# Patient Record
Sex: MALE | Race: WHITE | NOT HISPANIC OR LATINO | Employment: FULL TIME | URBAN - METROPOLITAN AREA
[De-identification: names, ages, dates, MRNs, and addresses within clinical notes are randomized per-mention and may not be internally consistent; named-entity substitution may affect disease eponyms.]

---

## 2017-01-13 ENCOUNTER — ALLSCRIPTS OFFICE VISIT (OUTPATIENT)
Dept: OTHER | Facility: OTHER | Age: 42
End: 2017-01-13

## 2017-01-31 ENCOUNTER — GENERIC CONVERSION - ENCOUNTER (OUTPATIENT)
Dept: OTHER | Facility: OTHER | Age: 42
End: 2017-01-31

## 2017-06-01 ENCOUNTER — GENERIC CONVERSION - ENCOUNTER (OUTPATIENT)
Dept: OTHER | Facility: OTHER | Age: 42
End: 2017-06-01

## 2017-07-19 ENCOUNTER — GENERIC CONVERSION - ENCOUNTER (OUTPATIENT)
Dept: OTHER | Facility: OTHER | Age: 42
End: 2017-07-19

## 2017-08-14 ENCOUNTER — ALLSCRIPTS OFFICE VISIT (OUTPATIENT)
Dept: OTHER | Facility: OTHER | Age: 42
End: 2017-08-14

## 2017-08-14 DIAGNOSIS — R55 SYNCOPE AND COLLAPSE: ICD-10-CM

## 2017-08-14 DIAGNOSIS — I10 ESSENTIAL (PRIMARY) HYPERTENSION: ICD-10-CM

## 2017-08-15 ENCOUNTER — TRANSCRIBE ORDERS (OUTPATIENT)
Dept: LAB | Facility: HOSPITAL | Age: 42
End: 2017-08-15

## 2017-08-15 DIAGNOSIS — I10 ESSENTIAL HYPERTENSION, MALIGNANT: Primary | ICD-10-CM

## 2017-08-25 ENCOUNTER — HOSPITAL ENCOUNTER (OUTPATIENT)
Dept: NON INVASIVE DIAGNOSTICS | Facility: HOSPITAL | Age: 42
Discharge: HOME | End: 2017-08-25
Payer: COMMERCIAL

## 2017-08-25 DIAGNOSIS — R55 SYNCOPE AND COLLAPSE: ICD-10-CM

## 2017-08-25 PROCEDURE — 93306 TTE W/DOPPLER COMPLETE: CPT

## 2017-08-28 ENCOUNTER — APPOINTMENT (OUTPATIENT)
Dept: LAB | Facility: HOSPITAL | Age: 42
End: 2017-08-28
Payer: COMMERCIAL

## 2017-08-28 DIAGNOSIS — I10 ESSENTIAL (PRIMARY) HYPERTENSION: ICD-10-CM

## 2017-08-28 LAB
ALBUMIN SERPL BCP-MCNC: 3.8 G/DL (ref 3.5–5)
ALP SERPL-CCNC: 101 U/L (ref 46–116)
ALT SERPL W P-5'-P-CCNC: 30 U/L (ref 12–78)
ANION GAP SERPL CALCULATED.3IONS-SCNC: 10 MMOL/L (ref 4–13)
AST SERPL W P-5'-P-CCNC: 20 U/L (ref 5–45)
BASOPHILS # BLD AUTO: 0.02 THOUSANDS/ΜL (ref 0–0.1)
BASOPHILS NFR BLD AUTO: 0 % (ref 0–1)
BILIRUB SERPL-MCNC: 0.41 MG/DL (ref 0.2–1)
BUN SERPL-MCNC: 11 MG/DL (ref 5–25)
CALCIUM SERPL-MCNC: 9.1 MG/DL (ref 8.3–10.1)
CHLORIDE SERPL-SCNC: 105 MMOL/L (ref 100–108)
CHOLEST SERPL-MCNC: 265 MG/DL (ref 50–200)
CO2 SERPL-SCNC: 21 MMOL/L (ref 21–32)
CREAT SERPL-MCNC: 1.08 MG/DL (ref 0.6–1.3)
EOSINOPHIL # BLD AUTO: 0 THOUSAND/ΜL (ref 0–0.61)
EOSINOPHIL NFR BLD AUTO: 0 % (ref 0–6)
ERYTHROCYTE [DISTWIDTH] IN BLOOD BY AUTOMATED COUNT: 13.2 % (ref 11.6–15.1)
GFR SERPL CREATININE-BSD FRML MDRD: 85 ML/MIN/1.73SQ M
GLUCOSE P FAST SERPL-MCNC: 121 MG/DL (ref 65–99)
HCT VFR BLD AUTO: 38.3 % (ref 36.5–49.3)
HDLC SERPL-MCNC: 40 MG/DL (ref 40–60)
HGB BLD-MCNC: 12.9 G/DL (ref 12–17)
LDLC SERPL CALC-MCNC: 172 MG/DL (ref 0–100)
LYMPHOCYTES # BLD AUTO: 1.89 THOUSANDS/ΜL (ref 0.6–4.47)
LYMPHOCYTES NFR BLD AUTO: 35 % (ref 14–44)
MCH RBC QN AUTO: 28.4 PG (ref 26.8–34.3)
MCHC RBC AUTO-ENTMCNC: 33.7 G/DL (ref 31.4–37.4)
MCV RBC AUTO: 84 FL (ref 82–98)
MONOCYTES # BLD AUTO: 0.45 THOUSAND/ΜL (ref 0.17–1.22)
MONOCYTES NFR BLD AUTO: 8 % (ref 4–12)
NEUTROPHILS # BLD AUTO: 2.96 THOUSANDS/ΜL (ref 1.85–7.62)
NEUTS SEG NFR BLD AUTO: 57 % (ref 43–75)
NRBC BLD AUTO-RTO: 0 /100 WBCS
PLATELET # BLD AUTO: 230 THOUSANDS/UL (ref 149–390)
PMV BLD AUTO: 10.4 FL (ref 8.9–12.7)
POTASSIUM SERPL-SCNC: 3.6 MMOL/L (ref 3.5–5.3)
PROT SERPL-MCNC: 8.1 G/DL (ref 6.4–8.2)
RBC # BLD AUTO: 4.54 MILLION/UL (ref 3.88–5.62)
SODIUM SERPL-SCNC: 136 MMOL/L (ref 136–145)
T3 SERPL-MCNC: 0.7 NG/ML (ref 0.6–1.8)
T4 FREE SERPL-MCNC: 0.73 NG/DL (ref 0.76–1.46)
T4 SERPL-MCNC: 4.1 UG/DL (ref 4.7–13.3)
TRIGL SERPL-MCNC: 267 MG/DL
TSH SERPL DL<=0.05 MIU/L-ACNC: 1.38 UIU/ML (ref 0.36–3.74)
VENIPUNCTURE: NORMAL
WBC # BLD AUTO: 5.35 THOUSAND/UL (ref 4.31–10.16)

## 2017-08-28 PROCEDURE — 36415 COLL VENOUS BLD VENIPUNCTURE: CPT

## 2017-08-28 PROCEDURE — 84479 ASSAY OF THYROID (T3 OR T4): CPT

## 2017-08-28 PROCEDURE — 84480 ASSAY TRIIODOTHYRONINE (T3): CPT

## 2017-08-28 PROCEDURE — 84439 ASSAY OF FREE THYROXINE: CPT

## 2017-08-28 PROCEDURE — 84443 ASSAY THYROID STIM HORMONE: CPT

## 2017-08-28 PROCEDURE — 84436 ASSAY OF TOTAL THYROXINE: CPT

## 2017-08-28 PROCEDURE — 85025 COMPLETE CBC W/AUTO DIFF WBC: CPT

## 2017-08-28 PROCEDURE — 80053 COMPREHEN METABOLIC PANEL: CPT

## 2017-08-28 PROCEDURE — 80061 LIPID PANEL: CPT

## 2017-08-29 LAB — T3RU NFR SERPL: 30 % (ref 24–39)

## 2017-08-31 ENCOUNTER — GENERIC CONVERSION - ENCOUNTER (OUTPATIENT)
Dept: OTHER | Facility: OTHER | Age: 42
End: 2017-08-31

## 2017-09-01 ENCOUNTER — ALLSCRIPTS OFFICE VISIT (OUTPATIENT)
Dept: OTHER | Facility: OTHER | Age: 42
End: 2017-09-01

## 2017-09-22 ENCOUNTER — ALLSCRIPTS OFFICE VISIT (OUTPATIENT)
Dept: OTHER | Facility: OTHER | Age: 42
End: 2017-09-22

## 2017-11-07 ENCOUNTER — ALLSCRIPTS OFFICE VISIT (OUTPATIENT)
Dept: OTHER | Facility: OTHER | Age: 42
End: 2017-11-07

## 2017-11-08 NOTE — PROGRESS NOTES
Assessment  1  Diastolic dysfunction (274 1) (I51 9)   2  Current smoker (305 1) (F17 200)   3  Benign essential hypertension (401 1) (I10)   4  Syncope and collapse (780 2) (R55)   5  Prolonged QT interval (794 31) (R94 31)   6  Esophageal varices (456 1) (I85 00)    Plan  COPD (chronic obstructive pulmonary disease)    · Ventolin  (90 Base) MCG/ACT Inhalation Aerosol Solution; IINHALE 1 OR 2  PUFFS BY MOUTH EVERY 4 - 6 HOURS AS NEEDED    Discussion/Summary    Keep follow up with cardiology in 6 weeks, follow up here in 2 months  The patient was counseled regarding diagnostic results,-- instructions for management,-- risk factor reductions,-- prognosis,-- patient and family education,-- impressions,-- risks and benefits of treatment options,-- importance of compliance with treatment  Chief Complaint  follow up since seeing cardiology      History of Present Illness  follow up for syncopal episodes with diastolic dysfunction found on echo, pt saw the cardiologist, who gave him the advice to tighten his leg muscles when standing, drink more water, and change positions slower, pt still feels lightheaded but has had no episodes of syncope since last visit, pt has not quit smoking, pt was stopped on cymbalta and zoloft, follow up for HTN, pt saw the nephrologist there was no change to his medications      Review of Systems    Cardiovascular: no chest pain-- and-- no palpitations  Respiratory: shortness of breath, but-- no wheezing  Active Problems  1  Abdominal pain (789 00) (R10 9)   2  Acute maxillary sinusitis (461 0) (J01 00)   3  Anxiety (300 00) (F41 9)   4  Asthma (493 90) (J45 909)   5  Atypical chest pain (786 59) (R07 89)   6  Benign essential hypertension (401 1) (I10)   7  Bipolar disorder (296 80) (F31 9)   8  COPD (chronic obstructive pulmonary disease) (496) (J44 9)   9  Current smoker (305 1) (F17 200)   10  Depression (311) (F32 9)   11  Diastolic dysfunction (013 3) (I51 9)   12  GERD (gastroesophageal reflux disease) (530 81) (K21 9)   13  Headache (784 0) (R51)   14  Heart burn (787 1) (R12)   15  Hematemesis (578 0) (K92 0)   16  Hypercholesterolemia (272 0) (E78 00)   17  Hypotension (458 9) (I95 9)   18  Insomnia (780 52) (G47 00)   19  Morbid or severe obesity due to excess calories (278 01) (E66 01)   20  Prolonged QT interval (794 31) (R94 31)   21  Sleep apnea (780 57) (G47 30)   22  Syncope and collapse (780 2) (R55)   23  Venous thromboembolism (453 9) (I82 90)    Past Medical History  1  History of urinary stone (V13 01) (I86 469)    Surgical History  1  History of Leg Repair   2  History of Tonsillectomy    Family History  Father    1  Family history of Diabetes (250 00) (E11 9)   2  Family history of Hypertension (401 9) (I10)  Brother    3  Family history of Diabetes (250 00) (E11 9)  Family History    4  Family history of Living In [address] [place] [address] History   · Current smoker (305 1) (F17 200)   · Denied: History of Drug Use   ·  (V61 03) (Z63 5)   · Stopped Drinking Alcohol  The social history was reviewed and is unchanged  Current Meds   1  Benztropine Mesylate 1 MG Oral Tablet; TAKE 1 TABLET 3 times daily; Therapy: (Recorded:57Otw0097) to Recorded   2  CarBAMazepine 200 MG Oral Tablet; TAKE 1 TABLET Twice daily PRN; Therapy: (Recorded:36Goi2668) to Recorded   3  ChlorproMAZINE HCl - 100 MG Oral Tablet; TAKE 1 TABLET 3 times daily; Therapy: (Recorded:41Mjx6168) to Recorded   4  CloNIDine HCl - 0 1 MG Oral Tablet; TAKE 1 TABLET TWICE DAILY  Requested for:   08Sep2017; Last Rx:08Sep2017 Ordered   5  DULoxetine HCl - 20 MG Oral Capsule Delayed Release Particles; take 1 capsule daily; Therapy: (Recorded:81Fja7991) to Recorded   6  Famotidine 20 MG Oral Tablet; TAKE 1 TABLET EVERY 12 HOURS DAILY; Therapy: (Recorded:25Sam2370) to Recorded   7  Lipitor 40 MG Oral Tablet; TAKE 1 TABLET DAILY; Therapy: (Recorded:69Jlv3786) to Recorded   8   Lisinopril 20 MG Oral Tablet; Take 1 tablet daily; Therapy: (Recorded:52Egd7574) to Recorded   9  Metoprolol Tartrate 50 MG Oral Tablet; TAKE 0 5 TABLET Twice daily; Therapy: 21YUB1103 to (Evaluate:06Jan2018)  Requested for: 08Sep2017; Last   Rx:08Sep2017 Ordered   10  Nicotine 21 MG/24HR Transdermal Patch 24 Hour; APPLY 1 PATCH DAILY AS DIRECTED     Requested for: 82Hyn7124; Last Rx:20Fpa0139 Ordered   11  SEROquel 50 MG Oral Tablet; TAKE 1 TABLET 3 TIMES DAILY; Therapy: (Isiah Velazquez) to Recorded   12  TraZODone HCl - 50 MG Oral Tablet; TAKE 1 TABLET AT BEDTIME AS NEEDED; Therapy: 01Sep2017 to (Evaluate:31Oct2017)  Requested for: 01Sep2017; Last    Rx:01Sep2017 Ordered    The medication list was reviewed and updated today  Allergies  1  No Known Drug Allergies  2  Tape    Vitals  Vital Signs    Recorded: 02EJB7162 02:32PM   Temperature 98 1 F, Tympanic   Heart Rate 98   Systolic 151   Diastolic 90   Height 5 ft 9 in   Weight 268 lb    BMI Calculated 39 58   BSA Calculated 2 34   O2 Saturation 98     Physical Exam    Constitutional   General appearance: No acute distress, well appearing and well nourished  well developed,-- appears healthy,-- well nourished-- and-- well hydrated  Eyes   Conjunctiva and lids: No swelling, erythema, or discharge  Pulmonary   Respiratory effort: No increased work of breathing or signs of respiratory distress  Respiratory rate: normal  Assessment of respiratory effort revealed normal rhythm and effort  Auscultation of lungs: Clear to auscultation, equal breath sounds bilaterally, no wheezes, no rales, no rhonci  no rales or crackles were heard bilaterally  no rhonchi  no friction rub  no wheezing  Cardiovascular   Auscultation of heart: Normal rate and rhythm, normal S1 and S2, without murmurs  The heart rate was normal  The rhythm was regular  Heart sounds: normal S1  no murmurs were heard  Lymphatic   Palpation of lymph nodes in neck: No lymphadenopathy  Skin   Skin and subcutaneous tissue: Normal without rashes or lesions      Psychiatric   Mood and affect: Normal          Signatures   Electronically signed by : Marilyn Ordonez DO; Nov 7 2017  3:03PM EST                       (Author)

## 2018-01-09 NOTE — MISCELLANEOUS
Message  RECEIVED A CALL FROM MARY NAN RODRIGUEZ [facility] VASCULAR LAB RE: Danette Lockwood  PATIENT WAS THERE THIS MORNING TO HAVE A DOPPLER DONE, IT IS POSITIVE FOR SUPERFICIAL PHLEBITIS THROMBOSIS THAT EXTENDS FROM HIS MID THIGH TO JUST BELOW HIS KNEE THEN BREAKS UP  SHE STESSED THAT IT IS SUPERFICIAL NOT DEEP  ALSO STATES THAT THE PATIENT COMPLAINED THAT WHEN HE WAS IN THE ED THE OTHER DAY THAT HE RECEIVED A SHOT OF LOVENOX AND THEN WENT INTO THE [place] AND THREW UP BLOOD  I SPOKE WHO WAS WAITING IN THE VASCULAR LAB AT THE TIME AND HE DID STATE THAT HE RECEIVED THE SHOT AND A PERCOCET IN THE ER AND THAT HE IMMEDIATE HAD STOMACH UPSET AND DID INFORM THE ER DR BUT SAID THAT HE WAS OK  PATIENT DID STATE THAT HE HAS A HISTORY OF STOMACH ULCERS  I SCHEDULED HIM FOR A FOLLOW UP WITH DR Gregory Young FOR [facility] 9/12/16 AT 10:15am    I CALLED AND SPOKE WITH DR Gregory Young WHO IS OUT OF THE OFFICE AND INFORMED HIM OF THE ISSUE  HE WOULD LIKE TO REFER PATIENT TO GI IMMEDIATELY TO HAVE HIS STOMACH TAKEN CARE OF AND HE WILL SEE HIM ON MONDAY FOR FOLLOW UP CARE  MM  I CALLED AND LEFT A MESSAGE FOR THE PATIENT TO CALL BACK RE: GI DR DONOVAN      Active Problems    1  Abdominal pain (789 00) (R10 9)   2  Anxiety (300 00) (F41 9)   3  Benign essential hypertension (401 1) (I10)   4  Bipolar disorder (296 80) (F31 9)   5  Depression (311) (F32 9)   6  Headache (784 0) (R51)   7  Heart burn (787 1) (R12)   8  Hypercholesterolemia (272 0) (E78 0)   9  Hypotension (458 9) (I95 9)   10  Morbid or severe obesity due to excess calories (278 01) (E66 01)   11  Venous thromboembolism (453 9) (I82 90)    Current Meds   1  ALPRAZolam 0 5 MG Oral Tablet (Xanax); TAKE 1 TABLET 3 TIMES DAILY AS NEEDED;   Last Rx:37Fcu2879 Ordered   2  CloNIDine HCl - 0 3 MG Oral Tablet; take 1 tablet by mouth twice a day; Therapy: 94TRL6046 to (Evaluate:12Apr2017)  Requested for: 46Rnz6547; Last   Rx:81Dxe0663 Ordered   3   Metoprolol Tartrate 25 MG Oral Tablet; take one tablet by mouth twice daily; Therapy: 72FIE5120 to (Evaluate:27Jan2017)  Requested for: 19OYL2446; Last   Rx:84Jjn5331 Ordered   4  TraZODone HCl - 150 MG Oral Tablet; TAKE 1 TABLET AT BEDTIME  Requested for:   20Jun2016; Last Rx:20Jun2016 Ordered   5  Zoloft 100 MG Oral Tablet (Sertraline HCl); Therapy: (Recorded:20Jun2016) to Recorded    Allergies    1   No Known Drug Allergies    Signatures   Electronically signed by : Dorothy Valente DO; Sep 13 2016  2:42PM EST

## 2018-01-10 NOTE — MISCELLANEOUS
Provider Comments  Provider Comments:   Patient was a no show for his appointment today        Signatures   Electronically signed by : Naseem Amin DO; Jun 1 2017  2:36PM EST

## 2018-01-11 NOTE — MISCELLANEOUS
Provider Comments  Provider Comments:   Patient was a no show for his appointment today        Signatures   Electronically signed by : Gerard Martinez DO; Dec  5 2016  4:45PM EST

## 2018-01-13 VITALS
BODY MASS INDEX: 39.69 KG/M2 | SYSTOLIC BLOOD PRESSURE: 150 MMHG | HEART RATE: 98 BPM | DIASTOLIC BLOOD PRESSURE: 90 MMHG | WEIGHT: 268 LBS | TEMPERATURE: 98.1 F | HEIGHT: 69 IN | OXYGEN SATURATION: 98 %

## 2018-01-13 VITALS
DIASTOLIC BLOOD PRESSURE: 96 MMHG | HEART RATE: 121 BPM | WEIGHT: 250.6 LBS | HEIGHT: 70 IN | RESPIRATION RATE: 18 BRPM | TEMPERATURE: 98.4 F | SYSTOLIC BLOOD PRESSURE: 135 MMHG | BODY MASS INDEX: 35.88 KG/M2

## 2018-01-13 VITALS
HEIGHT: 70 IN | SYSTOLIC BLOOD PRESSURE: 130 MMHG | BODY MASS INDEX: 36.79 KG/M2 | HEART RATE: 111 BPM | DIASTOLIC BLOOD PRESSURE: 88 MMHG | WEIGHT: 257 LBS | TEMPERATURE: 98.2 F | OXYGEN SATURATION: 96 %

## 2018-01-13 VITALS
HEIGHT: 70 IN | WEIGHT: 258.5 LBS | DIASTOLIC BLOOD PRESSURE: 88 MMHG | TEMPERATURE: 98.4 F | BODY MASS INDEX: 37.01 KG/M2 | SYSTOLIC BLOOD PRESSURE: 110 MMHG

## 2018-01-13 VITALS
DIASTOLIC BLOOD PRESSURE: 90 MMHG | SYSTOLIC BLOOD PRESSURE: 132 MMHG | HEIGHT: 69 IN | HEART RATE: 99 BPM | BODY MASS INDEX: 38.21 KG/M2 | WEIGHT: 258 LBS

## 2018-01-13 NOTE — MISCELLANEOUS
Assessment    1  Benign essential hypertension (401 1) (I10)   2  Hypotension (458 9) (I95 9)    Plan  Benign essential hypertension    · Metoprolol Tartrate 25 MG Oral Tablet; take one tablet by mouth twice daily   Rx By: Rolanda Ruffin; Dispense: 30 Days ; #:60 Tablet; Refill: 6; For: Benign essential hypertension; ISAAC = N; Sent To: [facility]    Discussion/Summary  Discussion Summary:   Iatrogenic hypotension secondary to med changes made by the hospital, rest today tomorrow take on, reduce metoprolol to 25 mg bid down from the 100mg given by the hospital       Chief Complaint  Chief Complaint Free Text Note Form: PATIENT PRESENTS FOR A MERON FROM [facility]   Chief Complaint Chronic Condition [facility]: Patient is here today for follow up of chronic conditions described in HPI  History of Present Illness  TCM Communication [facility]: The patient is being contacted for follow-up after hospitalization and [facility]  He was hospitalized at [facility]  The dates of hospitalization: 6/25/16 - 6/28/16  Diagnosis: HTN, ELEVATED HEART RATE  He was discharged to home  Medications were not reviewed today  He scheduled a follow up appointment  Symptoms: weakness, dizziness, fatigue, chest pain and nausea, but no fever, no headache, no cough, no shortness of breath, no back pain on left side, no back pain on right side, no arm pain left side, no arm pain on right side, no leg pain on left side, no leg pain on right side, no upper abdominal pain, no middle abdominal pain, no lower abdominal pain, no rash:, no anorexia, no vomiting, no loose stools, no constipation, no pain with urinating, no incisional pain, no wound drainage and no swelling     Communication performed and completed by   HPI: hospital follow up for malignant HTN, pt was in the hospital from [address] to tuesday of this week, pt was found to have a BP of 240/140, pt had his meds changed, pt complains today of sweats, weakness, fatigue, near fainting all since his discharge, pt does not know which medications he has been put on      Review of Systems  Complete-Male:   Constitutional: feeling tired, but no fever and no chills  Eyes: eyesight problems  Cardiovascular: chest pain, but no palpitations  Gastrointestinal: nausea, but no vomiting  Neurological: headache  Active Problems    1  Abdominal pain (789 00) (R10 9)   2  Anxiety (300 00) (F41 9)   3  Benign essential hypertension (401 1) (I10)   4  Bipolar disorder (296 80) (F31 9)   5  Depression (311) (F32 9)   6  Headache (784 0) (R51)   7  Heart burn (787 1) (R12)   8  Hypercholesterolemia (272 0) (E78 0)   9  Morbid or severe obesity due to excess calories (278 01) (E66 01)    Past Medical History    1  History of urinary stone (V13 01) (G98 410)    Surgical History    1  History of Leg Repair   2  History of Tonsillectomy    Family History  Family History    1  Family history of Living In [address] [place] [address] History    · Current Every Day Smoker (305 1)   · Denied: History of Drug Use   · Stopped Drinking Alcohol    Current Meds   1  Atenolol 50 MG Oral Tablet; TAKE 1 TABLET DAILY; Therapy: (Recorded:20Jun2016) to Recorded   2  CarBAMazepine 200 MG Oral Tablet; take 1 tablet by mouth four times a day; Therapy: 57NNO0953 to (Evaluate:22Oct2016)  Requested for: 95SKM8192; Last   Rx:24Jun2016 Ordered   3  CloNIDine HCl - 0 3 MG Oral Tablet; Take 1 tablet(s) by mouth twice daily; Therapy: 84ACY8949 to (Evaluate:17Mar2017)  Requested for: 20Jun2016; Last   Rx:20Jun2016 Ordered   4  TEGretol TABS; 400 mg in the a:m; 600 mg hs;   Therapy: (Recorded:12Mar2014) to Recorded   5  TraZODone HCl - 150 MG Oral Tablet; TAKE 1 TABLET AT BEDTIME  Requested for:   20Jun2016; Last Rx:20Jun2016 Ordered   6  Xanax 0 5 MG Oral Tablet; TAKE 1 TABLET 3 TIMES DAILY AS NEEDED; Therapy: (Lonnie Blanco) to Recorded   7  Zoloft 100 MG Oral Tablet;    Therapy: (Recorded:20Jun2016) to Recorded  Medication List Reviewed: The medication list was reviewed and updated today  Allergies    1  No Known Drug Allergies    Vitals  Signs [Data Includes: Current Encounter]   Recorded: 15GPW4734 67:70AV   Systolic: 866, LUE, Sitting  Diastolic: 90, LUE, Sitting  Recorded: 87GWT7620 11:27AM   Temperature: 95 9 F  Heart Rate: 70  Systolic: 98  Diastolic: 82  Height: 5 ft 10 in  Weight: 227 lb 2 oz  BMI Calculated: 32 59  BSA Calculated: 2 2    Physical Exam    Constitutional   General appearance: Abnormal   well developed, well nourished, obese and appears tired  Eyes   Conjunctiva and lids: No swelling, erythema, or discharge  Pupils and irises: Equal, round and reactive to light  Pulmonary   Respiratory effort: No increased work of breathing or signs of respiratory distress  Auscultation of lungs: Clear to auscultation, equal breath sounds bilaterally, no wheezes, no rales, no rhonci  Cardiovascular   Auscultation of heart: Normal rate and rhythm, normal S1 and S2, without murmurs  Examination of extremities for edema and/or varicosities: Normal     Abdomen   Abdomen: Non-tender, no masses  Liver and spleen: No hepatomegaly or splenomegaly  Lymphatic   Palpation of lymph nodes in neck: No lymphadenopathy  Skin   Skin and subcutaneous tissue: Normal without rashes or lesions      Psychiatric   Orientation to person, place and time: Normal     Mood and affect: Normal          Future Appointments    Date/Time Provider Specialty Site   07/05/2016 04:30 PM Verlee Snellen, [address] [place] [address]   Electronically signed by : Veronica Ly DO; Jul 1 2016 11:48AM EST                       (Author)

## 2018-01-15 NOTE — MISCELLANEOUS
Provider Comments  Provider Comments:   Patient was a no show for today's appointment        Signatures   Electronically signed by : Shy Hopson DO; Sep 27 2016  3:44PM EST

## 2018-01-15 NOTE — MISCELLANEOUS
Provider Comments  Provider Comments:   Patient was a no show for his appointment today        Signatures   Electronically signed by : Yuval De La Rosa DO; Feb 7 2017  9:38AM EST

## 2018-01-17 NOTE — MISCELLANEOUS
Provider Comments  Provider Comments:   Dear Stephany Rehman,    You missed your appointment with Dr Manning on 09/14/2016  Please contact our office to reschedule at [phone]      Thank You,  [facility]      Signatures   Electronically signed by : Clementina Aase, ; Sep 14 2016  9:30AM EST                       (Author)

## 2018-01-19 ENCOUNTER — GENERIC CONVERSION - ENCOUNTER (OUTPATIENT)
Dept: OTHER | Facility: OTHER | Age: 43
End: 2018-01-19

## 2018-01-24 VITALS
TEMPERATURE: 98 F | SYSTOLIC BLOOD PRESSURE: 160 MMHG | DIASTOLIC BLOOD PRESSURE: 82 MMHG | HEIGHT: 69 IN | BODY MASS INDEX: 39.84 KG/M2 | WEIGHT: 269 LBS | OXYGEN SATURATION: 97 % | HEART RATE: 85 BPM

## 2018-02-05 ENCOUNTER — TELEPHONE (OUTPATIENT)
Dept: GASTROENTEROLOGY | Facility: CLINIC | Age: 43
End: 2018-02-05

## 2018-02-21 PROBLEM — Z87.19 PERSONAL HISTORY OF DIGESTIVE DISEASE: Status: ACTIVE | Noted: 2018-02-21

## 2018-02-23 ENCOUNTER — TELEPHONE (OUTPATIENT)
Dept: GASTROENTEROLOGY | Facility: CLINIC | Age: 43
End: 2018-02-23

## 2018-02-23 NOTE — TELEPHONE ENCOUNTER
DR Jeanne Ervin PT    Pt called to r/s his procedure at the [facility] location  Please call his sister to set up appt  ([facility]) [phone]

## 2018-02-27 NOTE — TELEPHONE ENCOUNTER
I spoke with pt he requested to be rescheduled with Estephanie Faustin in [place]  Procedure scheduled on 6/22/18

## 2018-06-21 ENCOUNTER — ANESTHESIA EVENT (OUTPATIENT)
Dept: PERIOP | Facility: HOSPITAL | Age: 43
End: 2018-06-21
Payer: COMMERCIAL

## 2018-06-22 ENCOUNTER — ANESTHESIA (OUTPATIENT)
Dept: PERIOP | Facility: HOSPITAL | Age: 43
End: 2018-06-22
Payer: COMMERCIAL

## 2018-06-22 ENCOUNTER — HOSPITAL ENCOUNTER (OUTPATIENT)
Facility: HOSPITAL | Age: 43
Setting detail: OUTPATIENT SURGERY
Discharge: HOME | End: 2018-06-22
Attending: INTERNAL MEDICINE | Admitting: INTERNAL MEDICINE
Payer: COMMERCIAL

## 2018-06-22 VITALS
OXYGEN SATURATION: 95 % | TEMPERATURE: 97 F | RESPIRATION RATE: 18 BRPM | HEART RATE: 108 BPM | SYSTOLIC BLOOD PRESSURE: 133 MMHG | DIASTOLIC BLOOD PRESSURE: 62 MMHG

## 2018-06-22 DIAGNOSIS — Z87.19 PERSONAL HISTORY OF DIGESTIVE DISEASE: ICD-10-CM

## 2018-06-22 DIAGNOSIS — K20.90 ESOPHAGITIS: Primary | ICD-10-CM

## 2018-06-22 PROCEDURE — 88342 IMHCHEM/IMCYTCHM 1ST ANTB: CPT | Performed by: PATHOLOGY

## 2018-06-22 PROCEDURE — 43239 EGD BIOPSY SINGLE/MULTIPLE: CPT | Performed by: INTERNAL MEDICINE

## 2018-06-22 PROCEDURE — 88305 TISSUE EXAM BY PATHOLOGIST: CPT | Performed by: PATHOLOGY

## 2018-06-22 RX ORDER — MEPERIDINE HYDROCHLORIDE 25 MG/ML
12.5 INJECTION INTRAMUSCULAR; INTRAVENOUS; SUBCUTANEOUS AS NEEDED
Status: DISCONTINUED | OUTPATIENT
Start: 2018-06-22 | End: 2018-06-22 | Stop reason: HOSPADM

## 2018-06-22 RX ORDER — SODIUM CHLORIDE, SODIUM LACTATE, POTASSIUM CHLORIDE, CALCIUM CHLORIDE 600; 310; 30; 20 MG/100ML; MG/100ML; MG/100ML; MG/100ML
125 INJECTION, SOLUTION INTRAVENOUS CONTINUOUS
Status: DISCONTINUED | OUTPATIENT
Start: 2018-06-22 | End: 2018-06-22 | Stop reason: HOSPADM

## 2018-06-22 RX ORDER — OMEPRAZOLE 40 MG/1
40 CAPSULE, DELAYED RELEASE ORAL
Qty: 60 CAPSULE | Refills: 3 | Status: SHIPPED | OUTPATIENT
Start: 2018-06-22

## 2018-06-22 RX ORDER — SODIUM CHLORIDE, SODIUM LACTATE, POTASSIUM CHLORIDE, CALCIUM CHLORIDE 600; 310; 30; 20 MG/100ML; MG/100ML; MG/100ML; MG/100ML
125 INJECTION, SOLUTION INTRAVENOUS CONTINUOUS
Status: DISCONTINUED | OUTPATIENT
Start: 2018-06-22 | End: 2018-06-22

## 2018-06-22 RX ORDER — ONDANSETRON 2 MG/ML
4 INJECTION INTRAMUSCULAR; INTRAVENOUS ONCE AS NEEDED
Status: DISCONTINUED | OUTPATIENT
Start: 2018-06-22 | End: 2018-06-22 | Stop reason: HOSPADM

## 2018-06-22 RX ORDER — ALBUTEROL SULFATE 2.5 MG/3ML
2.5 SOLUTION RESPIRATORY (INHALATION) ONCE AS NEEDED
Status: DISCONTINUED | OUTPATIENT
Start: 2018-06-22 | End: 2018-06-22 | Stop reason: HOSPADM

## 2018-06-22 RX ORDER — PROPOFOL 10 MG/ML
INJECTION, EMULSION INTRAVENOUS AS NEEDED
Status: DISCONTINUED | OUTPATIENT
Start: 2018-06-22 | End: 2018-06-22 | Stop reason: SURG

## 2018-06-22 RX ORDER — LIDOCAINE HYDROCHLORIDE 10 MG/ML
INJECTION, SOLUTION INFILTRATION; PERINEURAL AS NEEDED
Status: DISCONTINUED | OUTPATIENT
Start: 2018-06-22 | End: 2018-06-22 | Stop reason: SURG

## 2018-06-22 RX ADMIN — PROPOFOL 50 MG: 10 INJECTION, EMULSION INTRAVENOUS at 10:12

## 2018-06-22 RX ADMIN — LIDOCAINE HYDROCHLORIDE 50 MG: 10 INJECTION, SOLUTION INFILTRATION; PERINEURAL at 10:01

## 2018-06-22 RX ADMIN — PROPOFOL 100 MG: 10 INJECTION, EMULSION INTRAVENOUS at 10:05

## 2018-06-22 RX ADMIN — PROPOFOL 50 MG: 10 INJECTION, EMULSION INTRAVENOUS at 10:08

## 2018-06-22 RX ADMIN — SODIUM CHLORIDE, SODIUM LACTATE, POTASSIUM CHLORIDE, AND CALCIUM CHLORIDE 125 ML/HR: .6; .31; .03; .02 INJECTION, SOLUTION INTRAVENOUS at 07:57

## 2018-06-22 RX ADMIN — PROPOFOL 100 MG: 10 INJECTION, EMULSION INTRAVENOUS at 10:03

## 2018-06-22 RX ADMIN — PROPOFOL 100 MG: 10 INJECTION, EMULSION INTRAVENOUS at 10:01

## 2018-06-22 NOTE — H&P
History and Physical -  [facility]  Manuel Walter 41 y o  male MRN: [id number]                  HPI: Manuel Walter is a 41y o  year old male who presents for varices surveillance      REVIEW OF SYSTEMS: Per the HPI, and otherwise unremarkable  Historical Information   Past Medical History:   Diagnosis Date    Bipolar 1 disorder ([facility] [address] )     Chest pain     COPD (chronic obstructive pulmonary disease) (HCC)     Hypertension     Leg DVT (deep venous thromboembolism), acute, right (HCC)     Migraine     correlates with his blood pressure     Psychiatric disorder     depression    Seizures (HCC)     Urinary stone      Past Surgical History:   Procedure Laterality Date    FRACTURE SURGERY      right leg repair    TONSILLECTOMY       Social History   History   Alcohol Use No     Comment: recovering alcoholic     History   Drug Use No     History   Smoking Status    Former Smoker   Smokeless Tobacco    Never Used     Family History   Problem Relation Age of Onset    Diabetes Father     Hypertension Father     Diabetes Brother        Meds/Allergies     Prescriptions Prior to Admission   Medication    benztropine (COGENTIN) 1 mg tablet    chlorproMAZINE (THORAZINE) 100 mg tablet    cloNIDine (CATAPRES) 0 3 mg tablet    lamoTRIgine (LaMICtal) 150 MG tablet    lisinopril (ZESTRIL) 20 mg tablet    metoprolol tartrate (LOPRESSOR) 50 mg tablet    nicotine (NICODERM CQ) 21 mg/24 hr TD 24 hr patch    QUEtiapine (SEROquel) 300 mg tablet    QUEtiapine (SEROQUEL) 50 mg tablet    albuterol (VENTOLIN HFA) 90 mcg/act inhaler       Allergies   Allergen Reactions    Other      Adhesive tape    Penicillins GI Intolerance       Objective     Blood pressure 136/92, pulse (!) 112, temperature 97 9 °F (36 6 °C), temperature source Tympanic, resp  rate 18, SpO2 94 %        PHYSICAL EXAM    Gen: NAD  CV: RRR  CHEST: Clear  ABD: soft, NT/ND  EXT: no edema      ASSESSMENT/PLAN:  This is a 43 y o  year old male here for varices surveillance    PLAN:   Procedure: EGD

## 2018-06-22 NOTE — OP NOTE
**** GI/ENDOSCOPY REPORT ****     PATIENT NAME: OMAIRA ZAVALA - VISIT ID:  Patient ID:   [id number] YOB: 1975     INTRODUCTION: Esophagogastroduodenoscopy - A 41 male patient presents for   an outpatient Esophagogastroduodenoscopy at [facility]  INDICATIONS: hx of esophageal varices  CONSENT: The benefits, risks, and alternatives to the procedure were   discussed and informed consent was obtained from the patient  PREPARATION:  EKG, pulse, pulse oximetry and blood pressure were monitored   throughout the procedure  MEDICATIONS: Anesthesia-check records MAC anesthesia  PROCEDURE:  The endoscope was passed without difficulty through the mouth   under direct visualization and advanced to the 2nd portion of the   duodenum  The scope was withdrawn and the mucosa was carefully examined  FINDINGS:   Esophagus: A few diminutive varices was found in the lower   esophagus  Severe esophagitis with ulcers and granulated tissue at GE   junction  A cold forceps biopsy was taken  Stomach: Gastritis was found   in the stomach  A cold forceps biopsy was taken from the antrum  Duodenum: The duodenum appeared to be normal      COMPLICATIONS: There were no complications  IMPRESSIONS: Esophageal varices found  Granulated tissue  Biopsy taken  Gastritis found  Biopsy taken  Normal duodenum  RECOMMENDATIONS: PPI follow up with GI office for alcoholic hepatitis  PATHOLOGY SPECIMENS: Cold forceps biopsy taken  Associated finding:   Granulated tissue  Cold forceps biopsy taken from antrum  Associated   finding: Gastritis  ESTIMATED BLOOD LOSS: Insignificant  PROCEDURE CODES:     ICD-9 Codes: 535 50 Unspecified gastritides and gastroduodenitis, without   mention of hemorrhage     ICD-10 Codes: I85 0 Esophageal varices K29 Gastritis and duodenitis     PERFORMED BY: KRISTOPHER Mccauley  on 06/22/2018    Version 1, electronically signed by Dr David Quintanilla M D  on 06/22/2018 at   10:28

## 2018-06-22 NOTE — ANESTHESIA PREPROCEDURE EVALUATION
Review of Systems/Medical History  Patient summary reviewed        Cardiovascular  Negative cardio ROS Hypertension ,    Pulmonary  Negative pulmonary ROS COPD mild- PRN medicaiton ,        GI/Hepatic  Negative GI/hepatic ROS          Negative  ROS        Endo/Other  Negative endo/other ROS      GYN  Negative gynecology ROS          Hematology  Negative hematology ROS      Musculoskeletal  Negative musculoskeletal ROS        Neurology  Negative neurology ROS Seizures ,  Headaches,    Psychology   Negative psychology ROS              Physical Exam    Airway    Mallampati score: III  TM Distance: >3 FB  Neck ROM: full     Dental   Comment: edentulous,     Cardiovascular  Comment: Negative ROS,     Pulmonary      Other Findings        Anesthesia Plan  ASA Score- 2     Anesthesia Type- IV sedation with anesthesia with ASA Monitors  Additional Monitors:   Airway Plan:     Comment: I have seen the patient and reviewed the history  Patient to receive IV sedation with full ASA monitors  Risks discussed with the patient, consent signed        Plan Factors-    Induction- intravenous  Postoperative Plan-     Informed Consent-   I personally reviewed this patient with the CRNA  Discussed and agreed on the Anesthesia Plan with the CRNA  Shantel Willis

## 2018-06-22 NOTE — ANESTHESIA POSTPROCEDURE EVALUATION
Post-Op Assessment Note      CV Status:  Stable    Mental Status:  Somnolent    Hydration Status:  Stable    PONV Controlled:  None    Airway Patency:  Patent    Post Op Vitals Reviewed: Yes          Staff: CRNA           BP      Temp   98   Pulse 114   Resp   20   SpO2   95%

## (undated) DEVICE — 2000CC GUARDIAN II: Brand: GUARDIAN

## (undated) DEVICE — CONMED SCOPE SAVER BITE BLOCK, 20X27 MM: Brand: SCOPE SAVER

## (undated) DEVICE — FORCEPS BIOPSY ALLIGATOR JAW W/NEEDLE 2.8MM

## (undated) DEVICE — LUBRICANT SURGILUBE TUBE 4 OZ  FLIP TOP

## (undated) DEVICE — TUBING SUCTION 5MM X 12 FT